# Patient Record
Sex: FEMALE | Race: WHITE
[De-identification: names, ages, dates, MRNs, and addresses within clinical notes are randomized per-mention and may not be internally consistent; named-entity substitution may affect disease eponyms.]

---

## 2015-11-25 VITALS
SYSTOLIC BLOOD PRESSURE: 150 MMHG | SYSTOLIC BLOOD PRESSURE: 150 MMHG | DIASTOLIC BLOOD PRESSURE: 91 MMHG | DIASTOLIC BLOOD PRESSURE: 91 MMHG

## 2017-01-23 ENCOUNTER — HOSPITAL ENCOUNTER (OUTPATIENT)
Dept: HOSPITAL 44 - OUT | Age: 72
End: 2017-01-23
Attending: ANESTHESIOLOGY
Payer: COMMERCIAL

## 2017-01-23 DIAGNOSIS — M70.72: ICD-10-CM

## 2017-01-23 DIAGNOSIS — M70.71: Primary | ICD-10-CM

## 2017-01-23 PROCEDURE — 20610 DRAIN/INJ JOINT/BURSA W/O US: CPT

## 2017-01-23 PROCEDURE — 77002 NEEDLE LOCALIZATION BY XRAY: CPT

## 2017-01-23 PROCEDURE — 99214 OFFICE O/P EST MOD 30 MIN: CPT

## 2017-01-25 NOTE — GREATER THROCHANTERIC BURSA IN
SUBJECTIVE:

I had the opportunity of following up with Kaley Guerrero today as an outpatient at 
[facility].  This is a patient I treated for left-sided 
trochanteric bursitis in November and she had profound immediate relief and is 
overall better.  She says that the symptoms are now returning on the left and 
she has also noted right-sided symptoms primarily in her hips.  She is 
interested in discussing treatment options for bursitis other than a steroid 
injection and I did discuss PRP therapy bilateral hip bursa today and she was 
very receptive to the idea of PRP therapy for her bursitis.  For today, I am 
going to plan on repeating bilateral greater trochanteric injections under 
fluoroscopy. 



PROCEDURE:

Bilateral greater trochanteric bursa injection with fluoroscopic guidance.



DESCRIPTION OF PROCEDURE:

The risks and benefits of the procedure were discussed with the patient, 
including the risk of infection, bleeding, nerve injury. Furthermore, I 
discussed the risk of steroid exposure causing hyperglycemia, hypertension, 
osteoporosis, or increased infectious risks. The patient understood the risks 
and agreed to proceed. Consent was obtained. 



The patient was placed in prone position on the fluoroscopy table and the skin 
overlying the left greater trochanter of the femur was cleaned with an alcohol 
swab. The trochanter was visualized under AP fluoroscopy. A 25 gauge 1.5 
needle was inserted over the trochanter and advanced until contact with the 
trochanter. It was verified that there was no aspiration of fluid or blood.  
Triamcinolone acetate and 0.25% of bupivacaine mixed with 1% lidocaine was 
injected. The needle was removed.   The exact same procedure was then repeated 
on the contralateral greater trochanteric bursa. 



The patient was monitored for 20 minutes following the procedure, during which 
time the patient experienced no adverse sequelae. The patient was discharged to 
home in good condition with a  driving the patient home. 



ASSESSMENT:

Bilateral greater trochanteric bursitis.



PLAN:

Bilateral greater trochanteric bursa injection with fluoroscopic guidance.



FOLLOW UP:

Return to clinic if problems develop or worsen.
CHEYENNE

## 2017-09-11 ENCOUNTER — HOSPITAL ENCOUNTER (OUTPATIENT)
Dept: HOSPITAL 44 - OUT | Age: 72
End: 2017-09-11
Attending: ANESTHESIOLOGY
Payer: COMMERCIAL

## 2017-09-11 DIAGNOSIS — M70.62: Primary | ICD-10-CM

## 2017-09-11 DIAGNOSIS — M70.61: ICD-10-CM

## 2017-09-11 PROCEDURE — 99213 OFFICE O/P EST LOW 20 MIN: CPT

## 2017-09-11 PROCEDURE — 20611 DRAIN/INJ JOINT/BURSA W/US: CPT

## 2017-09-12 NOTE — GREATER THROCHANTERIC BURSA IN
SUBJECTIVE:

I had the opportunity of following up with Kaley Guerrero today as an outpatient at 
[facility].  Kaley is a very pleasant 71-year-old white 
female with bilateral hip pain.  I have treated her with trochanteric bursa 
injections on January 23, 2017, and I had given her 1 previous injection for 
trochanteric bursitis last year and she has done quite well.  I did describe 
PRP injections for chronic recurrent bursitis.  I am going to plan today to 
repeat the bilateral trochanteric bursa injections under fluoroscopy.



PROCEDURE:

Bilateral greater trochanteric bursa injection with fluoroscopy.



DESCRIPTION OF PROCEDURE:

The risks and benefits of the procedure were discussed with the patient, 
including the risk of infection, bleeding, nerve injury. Furthermore, I 
discussed the risk of steroid exposure causing hyperglycemia, hypertension, 
osteoporosis, or increased infectious risks. The patient understood the risks 
and agreed to proceed. Consent was obtained. 



The patient was placed in prone position on the fluoroscopy table and the skin 
overlying the left greater trochanter of the femur was cleaned with an alcohol 
swab. The trochanter was visualized under AP fluoroscopy. A 23-gauge, 3.5 
needle was inserted over the trochanter and advanced until contact with the 
trochanter. It was verified that there was no aspiration of fluid or blood.  
The medications were injected. The needle was removed.



This exact same procedure was then repeated on the right greater trochanteric 
bursa. 



The patient was monitored for 20 minutes following the procedure, during which 
time the patient experienced no adverse sequelae. The patient was discharged to 
home in good condition with a  driving the patient home. 



ASSESSMENT:

Bilateral trochanteric bursitis. 



PLAN: 

Bilateral greater trochanteric bursa injection today with fluoroscopy. 



FOLLOW UP:

Return to clinic if problems develop or worsen.







cc:   Dr. Jerrod QUINN

## 2017-11-18 ENCOUNTER — HOSPITAL ENCOUNTER (EMERGENCY)
Dept: HOSPITAL 44 - ED | Age: 72
Discharge: HOME | End: 2017-11-18
Payer: COMMERCIAL

## 2017-11-18 VITALS
SYSTOLIC BLOOD PRESSURE: 122 MMHG | DIASTOLIC BLOOD PRESSURE: 76 MMHG | SYSTOLIC BLOOD PRESSURE: 122 MMHG | DIASTOLIC BLOOD PRESSURE: 76 MMHG

## 2017-11-18 DIAGNOSIS — S62.102A: Primary | ICD-10-CM
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
## 2017-11-18 PROCEDURE — 73562 X-RAY EXAM OF KNEE 3: CPT

## 2017-11-18 PROCEDURE — 73110 X-RAY EXAM OF WRIST: CPT

## 2017-11-18 PROCEDURE — 99283 EMERGENCY DEPT VISIT LOW MDM: CPT

## 2017-11-18 NOTE — ED PHYSICIAN DOCUMENTATION
Fall





- HISTORIAN


Historian: patient, spouse





- HPI


Chief Complaint: Fall


Additional Information: 





fell 11-1-17 then 3 times since- frequently involving pet dog. involves lt hip 

knee andklwrist w/large bruise spreading from hip distalward


Where: home


Context: tripped, slipped, lost balance


r: moderate


Associated Symptoms:: no loss of consciousness


Location of Pain/Injury: denies: head, neck, face


Injury to Right Extremity: denies: none


Injury to Left Extremity: wrist, knee (pt is chronic pain pt takes hydrocodone)





- ROS


CONST: no problems


NEURO: anxiety.  denies: dizziness, depression


MS/SKIN/LYMPH: denies: weakness, numbness, neck pain, back pain


EYES/ENT: denies: none


CVS/RESP: denies: none


GI/: denies: problems urinating, nausea, vomiting





- PAST HX


Past History: other (depression anxiety gerd)


Allergies/Adverse Reactions: 


 Allergies











Allergy/AdvReac Type Severity Reaction Status Date / Time


 


lithium [Lithium] Allergy Intermediate Hives Verified 11/18/17 16:28


 


amoxicillin trihydrate Allergy   Verified 11/18/17 16:28





[From Augmentin]     


 


potassium clavulanate Allergy   Verified 11/18/17 16:28





[From Augmentin]     


 


azithromycin [From Zithromax] AdvReac  Nausea/Vomi Verified 11/18/17 16:28





   ting  














Home Medications: 


 Ambulatory Orders











 Medication  Instructions  Recorded


 


Cholecalciferol (Vitamin D3) 2,000 unit PO DAILY  av 04/27/16





[Vitamin D]  














- SOCIAL HX


Smoking History: cigarettes


Alcohol Use: rarely


Drug Use: none





- FAMILY HX


Family History: no significant history





- VITAL SIGNS


Vital Signs: 





 Vital Signs











Temp Pulse Resp BP Pulse Ox


 


          150/91    


 


          11/25/15 11:18   














- REVIEWED ASSESSMENTS


Nursing Assessment  Reviewed: Yes


Vitals Reviewed: Yes





ED Results Lab/Radiology





- Radiology


Radiology Impressions: 





impacted fracture lt lwrist   -   knee hip appear ok





- Orders


Orders: 





 ED Orders











 Category Date Time Status


 


 KNEE 3 VIEWS [RAD] Stat Exams  11/18/17 Ordered


 


 LT HIP 2VIEW COMPLETE [RAD] Stat Exams  11/18/17 Ordered


 


 WRIST 3 VIEWS OR MORE [RAD] Stat Exams  11/18/17 Ordered














Fall Physical Exam





- Physical Exam


General Appearance: mild distress, moderate distress


Head: non-tender, no swelling, no obvious injury


Neck: non-tender, painless ROM


Eye: KRYSTAL, EOMI


Resp/CVS: chest non-tender, no ecchymosis, breath sounds nml, no resp. distress

, heart sounds nml


Abdomen: soft, non-tender


Neuro: oriented x3, sensation nml, motor nml, mood/affect nml.  No: unsteady 

gait


Skin: color nml, no rash, ecchymosis (lt hip area spreading down leg to knee-is 

not on blood thinners).  No: cyanosis, diaphoresis, pallor


Joint: No: nml ROM (limited knee and hip and wrist w/swelling-but can ambulate w

/o asst)





- [place] Coma Score


Eyes Open: Spontaneous


Speech: Oriented


Motor: Obeys Commands





Discharge


Clincal Impression: 


 fall w.impacted fracture lt wrist, multi abrasion contusion ligt sprain fr 





Referrals: 


Jerrod Guerrero MD [Primary Care Provider] - 2 Days


Comments: 





see ortho soon continue splint


Condition: Good


Disposition: 01 HOME, SELF-CARE


Decision to Admit: NO


Decision Time: 17:21

## 2017-11-18 NOTE — DIAGNOSTIC IMAGING REPORT
ALLYSON RAMIREZ 

[facility]

[address] [facility].[address] [address]

 

 

 

 

Report Submission Date: 2017 4:49:49 PM CST

Patient       Study

Name:   JORGE LOPEZ       Date:   2017 4:25:51 PM CST

MRN:   [id number]       Modality Type:   CR

Gender:   F       Description:   UPPER EXTREMITY

:   47       Institution:   [facility]

Physician:   ALLYSON RAMIREZ

     Accession:    [id number]

 

 

Examination: Plain film wrist 



History: Fall 



Comparison exams: None available 



Findings: 3 views the wrist demonstrates osteopenia. Articular degenerative 
changes. Fracture involving the distal radius. Avulsion of the ulna styloid. No 
gross soft tissue abnormalities. 



Impression: Distal radial fracture. Ulna styloid avulsion.

 

Electronically signed on 2017 4:49:49 PM CST by:

Phillip QUINN

## 2017-11-18 NOTE — DIAGNOSTIC IMAGING REPORT
ALLYSON RAMIREZ 

[facility]

[address] [facility] [address] [address]

 

 

 

 

Report Submission Date: 2017 4:48:13 PM CST

Patient       Study

Name:   JORGE LOPEZ       Date:   2017 4:12:02 PM CST

MRN:   [id number]       Modality Type:   CR

Gender:   F       Description:   LOWER EXTREMITY

:   47       Institution:   [facility]

Physician:   ALLYSON RAMIREZ

     Accession:    [id number]

 

 

Examination: Plain film knee 



History: Knee discomfort 



Findings: 3 views of the knee demonstrates osteopenia. No fracture.  No 
dislocation. No joint effusion. Mild patellar spurring. No soft tissue 
irregularity. 



Impression: Osteopenia. No acute osseous abnormality.

 

Electronically signed on 2017 4:48:13 PM CST by:

Phillip QUINN

## 2017-11-18 NOTE — DIAGNOSTIC IMAGING REPORT
ALLYSON RAMIREZ 

[facility]

[address] [facility] [address] [address]

 

 

 

 

Report Submission Date: 2017 4:37:12 PM CST

Patient       Study

Name:   JORGE LOPEZ       Date:   2017 4:16:27 PM CST

MRN:   [id number]       Modality Type:   CR

Gender:   F       Description:   PELVIS

:   46       Institution:   [facility]

Physician:   ALLYSON RAMIREZ

     Accession:    [id number]

 

 

Examination: Plain film hip 



History: Hip discomfort 



Comparison exams: None provided 



Findings: 3 views of the hip demonstrates prosthetic device in place. No 
fracture no dislocation. No soft tissue abnormality. 



Impression: Hip replacement in place without fracture or dislocation.

 

Electronically signed on 2017 4:37:12 PM CST by:

Phillip QUINN

## 2018-01-29 ENCOUNTER — HOSPITAL ENCOUNTER (OUTPATIENT)
Dept: HOSPITAL 44 - RAD | Age: 73
End: 2018-01-29
Attending: FAMILY MEDICINE
Payer: COMMERCIAL

## 2018-01-29 DIAGNOSIS — M25.552: Primary | ICD-10-CM

## 2018-01-29 NOTE — DIAGNOSTIC IMAGING REPORT
[facility]

[address] [facility].[address] [address]

 

 

 

 

Report Submission Date: 2018 5:06:54 PM CST

Patient       Study

Name:   JORGE LOPEZ       Date:   2018 4:26:16 PM CST

MRN:   [id number]       Modality Type:   CR

Gender:   F       Description:   PELVIS

:   47       Institution:   [facility]

Physician:   ANDRESSA LOPEZ

     Accession:    [id number]

 

 

Examination: Plain film hip 



History: Hip discomfort 



Comparison exams: None provided 



Findings: 2 views of the hip demonstrates a prosthetic device in place. No 
fracture no dislocation. No soft tissue abnormality. 



Impression: Prosthetic device. No acute appearing osseous abnormality.

 

Electronically signed on 2018 5:06:54 PM CST by:

Phillip QUINN

## 2018-03-30 ENCOUNTER — HOSPITAL ENCOUNTER (EMERGENCY)
Dept: HOSPITAL 44 - ED | Age: 73
Discharge: HOME | End: 2018-03-30
Payer: COMMERCIAL

## 2018-03-30 VITALS
DIASTOLIC BLOOD PRESSURE: 78 MMHG | SYSTOLIC BLOOD PRESSURE: 139 MMHG | SYSTOLIC BLOOD PRESSURE: 139 MMHG | SYSTOLIC BLOOD PRESSURE: 139 MMHG | DIASTOLIC BLOOD PRESSURE: 78 MMHG | SYSTOLIC BLOOD PRESSURE: 139 MMHG | DIASTOLIC BLOOD PRESSURE: 78 MMHG | DIASTOLIC BLOOD PRESSURE: 78 MMHG | DIASTOLIC BLOOD PRESSURE: 78 MMHG | DIASTOLIC BLOOD PRESSURE: 78 MMHG | SYSTOLIC BLOOD PRESSURE: 139 MMHG | SYSTOLIC BLOOD PRESSURE: 139 MMHG | DIASTOLIC BLOOD PRESSURE: 78 MMHG | DIASTOLIC BLOOD PRESSURE: 78 MMHG | SYSTOLIC BLOOD PRESSURE: 139 MMHG | SYSTOLIC BLOOD PRESSURE: 139 MMHG

## 2018-03-30 DIAGNOSIS — M79.1: Primary | ICD-10-CM

## 2018-03-30 PROCEDURE — 99283 EMERGENCY DEPT VISIT LOW MDM: CPT

## 2018-03-30 NOTE — ED PHYSICIAN DOCUMENTATION
General Adult





- HISTORIAN


Historian: patient





- HPI


Stated Complaint: Pain to frontal hip (L) that started at 3am, Hx chronic 

lateral Lt hip pain


Chief Complaint: General Adult


Onset: hours


Timing: still present


Severity: moderate


Further Comments: yes (Pt is a 73 yo female with pain in her L hip.  Pt has had 

L hip replacement and has had chronic lateral hip pain, for which she sees Dr. Newby.  Pain today started at 3:00 am.)





- ROS


CONST: no problems


EYES/ENT: none


CVS/RESP: none


GI/: none


MS/SKIN/LYMPH: other (L hip pain)





- PAST HX


Past History: other (HTN, GERD, Depression/Anxiety, Hearing loss, vision 

problems)


Surgeries/Procedures: other (R shoulder replacement; L hip replacement)


Allergies/Adverse Reactions: 


 Allergies











Allergy/AdvReac Type Severity Reaction Status Date / Time


 


lithium [Lithium] Allergy Intermediate Hives Verified 03/30/18 07:56


 


amoxicillin trihydrate Allergy   Verified 03/30/18 07:56





[From Augmentin]     


 


potassium clavulanate Allergy   Verified 03/30/18 07:56





[From Augmentin]     


 


azithromycin [From Zithromax] AdvReac  Nausea/Vomi Verified 03/30/18 07:56





   ting  














Home Medications: 


 Ambulatory Orders











 Medication  Instructions  Recorded


 


Cholecalciferol (Vitamin D3) 2,000 unit PO DAILY  av 04/27/16





[Vitamin D]  














- SOCIAL HX


Smoking History: cigarettes





- FAMILY HX


Family History: No





- VITAL SIGNS


Vital Signs: 





 Vital Signs











Temp Pulse Resp BP Pulse Ox


 


 98.1 F   88   16   150/86   93 


 


 03/30/18 07:10  03/30/18 07:10  03/30/18 07:10  03/30/18 07:10  03/30/18 07:10














- REVIEWED ASSESSMENTS


Nursing Assessment  Reviewed: Yes


Vitals Reviewed: Yes





Progress





- Progress


Progress: 





X-ray L hip: Left hip replacement. Osteopenia and degenerative changes. No 

acute appearing osseous abnormality.





ED Results Lab/Radiology





- Orders


Orders: 





 ED Orders











 Category Date Time Status


 


 LT HIP 2VIEW COMPLETE [RAD] Stat Exams  03/30/18 Ordered














General Adult Physical Exam





- PHYSICAL EXAM


GENERAL APPEARANCE: mild distress


NECK: normal inspection, supple


RESPIRATORY: no resp distress, chest non-tender, breath sounds normal


CVS: reg rate & rhythm, heart sounds normal


BACK: normal inspection


SKIN: warm/dry, normal color


EXTREMITIES: other (L hip tenderness; FROM)


NEURO: oriented X3, motor nml





Discharge


Clincal Impression: 


 Musculoskeletal pain





Referrals: 


Jerrod Guerrero MD [Primary Care Provider] - 


Condition: Good


Disposition: 01 HOME, SELF-CARE


Decision to Admit: NO


Decision Time: 09:10

## 2018-03-30 NOTE — DIAGNOSTIC IMAGING REPORT
[facility]

[address] [facility].[address] [address]

 

 

 

 

Report Submission Date: Mar 30, 2018 8:47:06 AM CDT

Patient       Study

Name:   JORGE LOPEZ       Date:   Mar 30, 2018 8:17:07 AM CDT

MRN:   [id number]       Modality Type:   DX

Gender:   F       Description:   PELVIS

:   47       Institution:   [facility]

Physician:   ANNIE WHITE

     Accession:    [id number]

 

 

Examination: Plain film pelvis/left hip 



History: PT STATE HIP PAIN FOR 3X DAYS, HX OF SURGERY (Hx) 



Comparison exams: None provided 



Findings: 3 views of the pelvis and left hip demonstrate left hip prosthesis in 
place. Generalized osteopenia. No fracture.  No dislocation. Articular 
degenerative changes. No soft tissue abnormality. 



Impression: Left hip replacement. Osteopenia and degenerative changes. No acute 
appearing osseous abnormality.

 

Electronically signed on Mar 30, 2018 8:47:06 AM CDT by:

Phillip QUINN

## 2018-04-17 ENCOUNTER — HOSPITAL ENCOUNTER (OUTPATIENT)
Dept: HOSPITAL 44 - OUT | Age: 73
End: 2018-04-17
Attending: ANESTHESIOLOGY
Payer: COMMERCIAL

## 2018-04-17 DIAGNOSIS — M70.60: Primary | ICD-10-CM

## 2018-04-17 PROCEDURE — 99214 OFFICE O/P EST MOD 30 MIN: CPT

## 2018-04-19 NOTE — PAIN CLINIC PROGRESS NOTES
REASON FOR VISIT: 

I had the opportunity of seeing Kaley Guerrero in follow up.   This is a delightful 
72-year-old white female who I have treated for bilateral greater trochanteric 
bursitis.  She says that a couple of weeks ago she was having relatively severe 
pain in the area of the posterior sciatic notch on both sides when she walked.  
She said her walking was limited.  Her ambulation could not be sustained and 
she had been trying to walk for exercise but that between the pain and the 
weather, she has been unable to do any sustained walking.  She said that over 
the course of the last 2 weeks that the symptoms have for the most part 
resolved.  She says at this point, she is having very little pain in the 
posterior gluteal fold, achiness of the legs, or weakness.  I re-examined her 
today and the area of her chief complaint is at the bilateral sciatic notch.  
There is no evidence of radiculitis.  Negative straight leg raise.  Negative 
assisted extension and extension rotation findings.  Some tenderness over the 
bilateral greater trochanteric bursa.  



ASSESSMENT:

1.   Likely spinal stenosis.  I have recommended doing an MRI study and 
possible caudal epidural.

2.   History of greater trochanteric bursitis, now with some mild recurrence. 



PLAN: 

 At this point, Ms. Guerrero is doing well and is relatively asymptomatic.  I did 
not recommend any further interventional treatment today.  She is in agreement 
but would like a follow up next month, as she thinks that her symptoms may well 
reoccur when she begins more active activity with the better weather.  



Thank you again for allowing me to take part in the care of this nice lady.  I 
appreciate the opportunity to take part in the care of your patients. 





cc:   Dr. Jerrod QUINN

## 2018-05-15 ENCOUNTER — HOSPITAL ENCOUNTER (OUTPATIENT)
Dept: HOSPITAL 44 - OUT | Age: 73
End: 2018-05-15
Attending: ANESTHESIOLOGY
Payer: COMMERCIAL

## 2018-05-15 DIAGNOSIS — M70.62: Primary | ICD-10-CM

## 2018-05-15 DIAGNOSIS — M70.61: ICD-10-CM

## 2018-05-15 PROCEDURE — 99213 OFFICE O/P EST LOW 20 MIN: CPT

## 2018-05-16 NOTE — GREATER THROCHANTERIC BURSA IN
SUBJECTIVE:

Ms. Guerrero is a very nice 72-year-old white female who follows up with me today 
with left hip and lateral thigh pain consistent with trochanteric bursitis.  I 
re-examined her and she has a negative straight leg raise but no evidence of 
radiculitis.  Symptoms are primarily reproduced over the trochanteric bursa.  
Plan for a left trochanteric bursa injection under fluoroscopy today. 



PROCEDURE:

Left greater trochanteric bursa injection with fluoroscopy.



DESCRIPTION OF PROCEDURE:

The risks and benefits of the procedure were discussed with the patient, 
including the risk of infection, bleeding, nerve injury.  Furthermore, I 
discussed the risk of steroid exposure causing hyperglycemia, hypertension, 
osteoporosis, or increased infectious risks. The patient understood the risks 
and agreed to proceed.  Consent was obtained. 



The patient was placed in prone position on the fluoroscopy table and the skin 
overlying the left greater trochanter of the femur was cleaned.  The trochanter 
was visualized under AP fluoroscopy.   A needle was inserted over the 
trochanter and advanced until contact with the trochanter.  It was verified 
that there was no aspiration of fluid or blood. The medication was injected.  
The needle was removed. 



The patient was monitored for 20 minutes following the procedure, during which 
time the patient experienced no adverse sequelae. The patient was discharged to 
home in good condition with a  driving the patient home. 



ASSESSMENT:

Trochanteric bursitis of the hip.



PLAN:

Left greater trochanteric bursa injection today. 



FOLLOW UP:

Return to clinic if problems develop or worsen.





cc:  Dr. Jerrod QUINN

## 2018-08-21 ENCOUNTER — HOSPITAL ENCOUNTER (OUTPATIENT)
Dept: HOSPITAL 44 - OUT | Age: 73
End: 2018-08-21
Attending: ANESTHESIOLOGY
Payer: COMMERCIAL

## 2018-08-21 DIAGNOSIS — M70.61: ICD-10-CM

## 2018-08-21 DIAGNOSIS — M70.62: Primary | ICD-10-CM

## 2018-08-21 PROCEDURE — 20611 DRAIN/INJ JOINT/BURSA W/US: CPT

## 2018-08-21 PROCEDURE — 99213 OFFICE O/P EST LOW 20 MIN: CPT

## 2018-08-26 NOTE — GREATER THROCHANTERIC BURSA IN
SUBJECTIVE:

Kaley follows up with me today with bilateral hip pain.  She has multiple 
complaints primarily polyarthropathy related.  She says the worst complaint is 
her hips, right somewhat worse than the left.  She says the best she has ever 
done is following bilateral trochanteric bursa injections and she follows up 
with bilateral trochanteric bursitis today.  She has questions regarding 
activity.  She also has complaints of heel pain and has a presumptive diagnosis 
of plantar fasciitis.   She has orthotics in her shoes at this point.  



PHYSICAL EXAMINATION:

General:  The patient is well nourished, well developed, and in no apparent 
distress. Awake, alert, and oriented. 

HEENT: Pupils are equal, round, and reactive to light and accommodation. 
Extraocular movements intact. No facial droop.

Neck: There is full range of motion of the cervical spine. No evidence of 
adenopathy. Thyroid is nontender, not enlarged. Carotids are without bruits.

Chest: Clear to auscultation bilaterally. Normal chest  excursion.

Heart: Regular rate and rhythm without murmur.

Abdomen: Benign. Normoactive bowel sounds.

Motor/sensory: Intact in the upper and lower extremities. Moves all extremities 
freely.

Extremities:  Positive Tinel's sign at bilateral trochanteric bursa. 

 

PROCEDURE:

Bilateral greater trochanteric bursa injection with fluoroscopy.



DESCRIPTION OF PROCEDURE:

The risks and benefits of the procedure were discussed with the patient, 
including the risk of infection, bleeding, nerve injury. Furthermore, I 
discussed the risk of steroid exposure causing hyperglycemia, hypertension, 
osteoporosis, or increased infectious risks. The patient understood the risks 
and agreed to proceed. Consent was obtained. 



The patient was placed in prone position on the fluoroscopy table and the skin 
overlying the left greater trochanter of the femur was cleaned with an alcohol 
swab. The trochanter was visualized under AP fluoroscopy.   A needle was 
inserted over the trochanter and advanced until contact with the trochanter.  It
was verified that there was no aspiration of fluid or blood.  The medication was
injected.  The needle was removed. 



This exact same procedure was repeated on the contralateral greater trochanteric
bursa. 



The patient was monitored for 20 minutes following the procedure, during which 
time the patient experienced no adverse sequelae. The patient was discharged to 
home in good condition with a  driving the patient home. 



ASSESSMENT:

Bilateral greater trochanteric bursitis. 



PLAN:

Bilateral greater trochanteric bursa injection today under fluoroscopy.



FOLLOW UP:

Return to clinic if problems develop or worsen.





cc:  Dr. Jerrod QUINN

## 2018-11-05 ENCOUNTER — HOSPITAL ENCOUNTER (OUTPATIENT)
Dept: HOSPITAL 44 - POD | Age: 73
End: 2018-11-05
Attending: PODIATRIST
Payer: COMMERCIAL

## 2018-11-05 DIAGNOSIS — M77.41: ICD-10-CM

## 2018-11-05 DIAGNOSIS — M72.2: Primary | ICD-10-CM

## 2018-11-05 DIAGNOSIS — M77.42: ICD-10-CM

## 2018-11-05 PROCEDURE — 99212 OFFICE O/P EST SF 10 MIN: CPT

## 2018-11-06 NOTE — OP CLINIC PROGRESS NOTE
SUBJECTIVE:

Kaley Guerrero is a 72-year-old female who presented today for follow up of 
bilateral heel pain and bilateral metatarsal head pain.  The patient was sent 
after her last visit for an orthopedic consult for an opinion on if there were 
other issues more proximal in the kinetic chain.   It appears that the patient 
was diagnosed with plantar fasciitis by the orthopedic doctor as well, as well 
as, tiara and was given padding and stretching exercises to do.  The 
patient states that she has been doing stretching exercises and her  has 
been helping with those and massaging her feet and she was coming in today to 
have an injection performed to her heel but she made this appointment about 4 
weeks ago and the pain is much improved at this time.  She does not wish to have
an injection at this time and feels that overall she is improving greatly.  She 
leaves town in the next 10 days, I believe.  She does not admit to any fevers, 
chills, nausea, vomiting, shortness of breath, or chest pain at this time.  
Notable history includes chronic low back pain and GERD.



OBJECTIVE:

Vital Signs:   Pulse oximetry was 91%, BP:  122/65, heart rate 81, R: 18.

Dermatologic exam:  There are no open lesions, no hyperkeratosis noted, and no 
significant varicosities noted on bilateral feet.

VASCULAR:   There are 2+ DP and PT pulses bilaterally.  Capillary refill time is
less than 3 seconds to the toes bilaterally.   No edema bilaterally. 

MUSCULOSKELETAL:   No pain on palpation noted to the metatarsal heads 
bilaterally.  There is mild pain on palpation noted to the plantar medial heel 
bilaterally.  There is about 5 degrees of dorsiflexion range of motion in the 
ankle with the knees extended bilaterally.  I believe this is improved from last
time.  There are no other gross abnormalities noted outside of some hammertoes 
and minor hallux valgus bilaterally. 

NEUROLOGIC:   Light touch sensation is intact to the toes bilaterally. 



ASSESSMENT:

1.   Plantar fasciitis, bilateral. 

2.   Metatarsalgia of bilateral feet. 



PLAN: 

The patient seems to be improving greatly with stretching exercises, etc. given 
by myself, as well as by Dr. Behrouzi, the orthopedic doctor from [place] whom 
the patient was referred to.  The patient refuses any sort of shot today and 
believes she is improving greatly at this time.  She will return to the clinic 
as needed if she continues to have pain or stops improving or worsens in any way
in her feet.  The patient is happy with this plan and denies any other questions
or concerns at this time.  





cc:   Dr. Jerrod QUINN

## 2018-12-18 ENCOUNTER — HOSPITAL ENCOUNTER (OUTPATIENT)
Dept: HOSPITAL 44 - OUT | Age: 73
End: 2018-12-18
Attending: ANESTHESIOLOGY
Payer: COMMERCIAL

## 2018-12-18 DIAGNOSIS — M70.62: Primary | ICD-10-CM

## 2018-12-18 PROCEDURE — 20610 DRAIN/INJ JOINT/BURSA W/O US: CPT

## 2018-12-18 PROCEDURE — 77002 NEEDLE LOCALIZATION BY XRAY: CPT

## 2018-12-28 NOTE — GREATER THROCHANTERIC BURSA IN
SUBJECTIVE:

Ms. Guerrero follows up with me today with a history of recurrent left hip pain.  
She says she has been doing some exercises for plantar fasciitis and has 
suffered recurrent pain over the left trochanteric bursa.  She has had good 
success with a previous trochanteric bursa injection, and I am going to plan on 
repeating it on the left side today. 



PROCEDURE:

Left greater trochanteric bursa injection with fluoroscopy.



DESCRIPTION OF PROCEDURE:

The risks and benefits of the procedure were discussed with the patient, 
including the risk of infection, bleeding, nerve injury.  Furthermore, I 
discussed the risk of steroid exposure causing hyperglycemia, hypertension, 
osteoporosis, or increased infectious risks.  The patient understood the risks 
and agreed to proceed.  Consent was obtained. 



The patient was placed in prone position on the fluoroscopy table and the skin 
overlying the left greater trochanter of the femur was cleaned. The trochanter 
was visualized under AP fluoroscopy.  A needle was inserted over the trochanter 
and advanced until contact with the trochanter.  It was verified that there was 
no aspiration of fluid or blood.  At this point, Omnipaque 240 myelogram dye was
injected.  It was verified with fluoroscopic views that the dye was located in 
the desired distribution. The medication was injected.  The needle was removed. 



The patient was monitored for 20 minutes following the procedure, during which 
time the patient experienced no adverse sequelae. The patient was discharged to 
home in good condition with a  driving the pt. home. 



ASSESSMENT:

Left greater trochanteric bursitis.



PLAN:

Left greater trochanteric bursa injection today. 



FOLLOW UP:

Return to clinic if problems develop or worsen.





cc:  Dr. Jerrod QUINN

## 2019-01-18 ENCOUNTER — HOSPITAL ENCOUNTER (EMERGENCY)
Dept: HOSPITAL 44 - ED | Age: 74
Discharge: HOME | End: 2019-01-18
Payer: COMMERCIAL

## 2019-01-18 DIAGNOSIS — S70.01XA: Primary | ICD-10-CM
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
## 2019-01-18 PROCEDURE — 99281 EMR DPT VST MAYX REQ PHY/QHP: CPT

## 2019-01-24 ENCOUNTER — HOSPITAL ENCOUNTER (OUTPATIENT)
Dept: HOSPITAL 44 - RAD | Age: 74
End: 2019-01-24
Attending: FAMILY MEDICINE
Payer: COMMERCIAL

## 2019-01-24 DIAGNOSIS — M16.11: Primary | ICD-10-CM

## 2019-01-24 PROCEDURE — 73502 X-RAY EXAM HIP UNI 2-3 VIEWS: CPT

## 2019-01-24 NOTE — DIAGNOSTIC IMAGING REPORT
ANDRESSA LOPEZ 

[facility]

[address] [facility] [address] [address]

 

 

 

 

Report Submission Date: 2019 5:29:12 PM CST

Patient       Study

Name:   JORGE LOPEZ       Date:   2019 4:05:39 PM CST

MRN:   [id number]       Modality Type:   DX

Gender:   F       Description:   PELVIS

:   47       Institution:   [facility]

Physician:   ANDRESSA LOPEZ

     Accession:    [id number]

 

 

Right hip 2 views 

Clinical history pain 

Technique AP frog leg. 

Findings: This appeared joint space narrowing. No fracture dislocation is 
identified. Bone density is within normal limits. The right pubic rami are 
intact 

Impression: Degenerative arthritis otherwise negative

 

Electronically signed on 2019 5:29:12 PM CST by:

Jon QUINN

## 2019-02-24 ENCOUNTER — HOSPITAL ENCOUNTER (OUTPATIENT)
Dept: HOSPITAL 44 - ED | Age: 74
Setting detail: OBSERVATION
LOS: 1 days | Discharge: HOME | End: 2019-02-25
Attending: FAMILY MEDICINE | Admitting: FAMILY MEDICINE
Payer: COMMERCIAL

## 2019-02-24 VITALS — BODY MASS INDEX: 26.4 KG/M2

## 2019-02-24 DIAGNOSIS — S32.511A: Primary | ICD-10-CM

## 2019-02-24 DIAGNOSIS — Z96.642: ICD-10-CM
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
## 2019-02-24 DIAGNOSIS — M79.89: ICD-10-CM
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
## 2019-02-24 DIAGNOSIS — M15.9: ICD-10-CM

## 2019-02-24 DIAGNOSIS — F32.9: ICD-10-CM

## 2019-02-24 PROCEDURE — 99283 EMERGENCY DEPT VISIT LOW MDM: CPT

## 2019-02-24 PROCEDURE — 81002 URINALYSIS NONAUTO W/O SCOPE: CPT

## 2019-02-24 PROCEDURE — 99284 EMERGENCY DEPT VISIT MOD MDM: CPT

## 2019-02-24 PROCEDURE — 70450 CT HEAD/BRAIN W/O DYE: CPT

## 2019-02-24 PROCEDURE — 99217: CPT

## 2019-02-24 PROCEDURE — 72170 X-RAY EXAM OF PELVIS: CPT

## 2019-02-24 PROCEDURE — 93970 EXTREMITY STUDY: CPT

## 2019-02-24 PROCEDURE — G0378 HOSPITAL OBSERVATION PER HR: HCPCS

## 2019-02-24 PROCEDURE — 80048 BASIC METABOLIC PNL TOTAL CA: CPT

## 2019-02-24 PROCEDURE — 72192 CT PELVIS W/O DYE: CPT

## 2019-02-24 PROCEDURE — 85025 COMPLETE CBC W/AUTO DIFF WBC: CPT

## 2019-02-24 RX ADMIN — HEPARIN SODIUM SCH UNIT: 5000 INJECTION, SOLUTION INTRAVENOUS; SUBCUTANEOUS at 23:16

## 2019-02-24 NOTE — ED PHYSICIAN DOCUMENTATION
Fall





- HISTORIAN


Historian: patient, spouse





- HPI


Stated Complaint: fall


Chief Complaint: Fall


Additional Information: 





Patient presents to ED via EMS after falling tonight.  Patient states she fell 

backward falling on her buttocks and hitting the back of her head while trying 

to stand from her wheel chair to a walker.  Patient states she did not lose 

consciousness.  This is her third fall in the past 2 months.  She has a skin 

tear on her left elbow.   reports difficulty taking care of her at home. 


Onset: just prior to arrival


Where: home


Context: lost balance


r: mild


Associated Symptoms:: no loss of consciousness


Location of Pain/Injury: head, other (tailbone)


Injury to Right Extremity: none


Injury to Left Extremity: elbow (skin tear)





- ROS


CONST: denies: fever


NEURO: denies: dizziness


MS/SKIN/LYMPH: weakness


EYES/ENT: none


CVS/RESP: none


GI/: denies: nausea, vomiting





- PAST HX


Past History: none


Allergies/Adverse Reactions: 


                                    Allergies











Allergy/AdvReac Type Severity Reaction Status Date / Time


 


lithium [Lithium] Allergy Intermediate Hives Verified 18 07:56


 


acetaminophen Allergy Unknown  Unverified 13 13:18


 


bupropion HCl Allergy Unknown  Unverified 13 13:17


 


hydrocodone bitartrate Allergy Unknown  Unverified 13 13:18


 


iodine Allergy Unknown  Unverified 13 13:18


 


amoxicillin trihydrate Allergy   Verified 18 07:56





[From Augmentin]     


 


potassium clavulanate Allergy   Verified 18 07:56





[From Augmentin]     


 


azithromycin [From Zithromax] AdvReac  Nausea/Vomi Verified 18 07:56





   ting  














Home Medications: 


                                Ambulatory Orders











 Medication  Instructions  Recorded


 


Cholecalciferol (Vitamin D3) 2,000 unit PO DAILY  av 16





[Vitamin D]  


 


Bupropion HCl 75 mg PO [id number]


 


Clorazepate Dipotassium [Tranxene 15 mg PO TID 19





T-Tab]  














- SOCIAL HX


Smoking History: non-smoker


Alcohol Use: none


Drug Use: none





- FAMILY HX


Family History: none





- VITAL SIGNS


Vital Signs: 





                                   Vital Signs











Temp Pulse Resp BP Pulse Ox


 


          139/78    


 


          18 09:04   














- REVIEWED ASSESSMENTS


Nursing Assessment  Reviewed: Yes


Vitals Reviewed: Yes





ED Results Lab/Radiology





- Radiology


Radiology Impressions: 


Report Submission Date: 2019 8:33:38 PM CST


Patient       Study


Name:   JORGE LOPEZ       Date:   2019 8:04:56 PM CST


MRN:   [id number]       Modality Type:   DX


Gender:   F       Description:   PELVIS AP 1 OR 2 VIEWS


:   47       Institution:   [facility]


Physician:   COLE MANCILLA


     Accession:    [id number]


 


 


Single view pelvis 





Clinical history: Trauma 





Findings/impression: 





The left hip replacement is in good position. The right hip joint is maintained.

There is likely nondisplaced right medial pubic rami/pubic symphysis fracture.


 


Electronically signed on 2019 8:33:38 PM [facility] by:


Zachariah Brown











Report Submission Date: 2019 8:36:58 PM CST


Patient       Study


Name:   JORGE LOPEZ       Date:   2019 8:10:43 PM CST


MRN:   [id number]       Modality Type:   CT\SR


Gender:   F       Description:   CT BRAIN W/O CONTRAST


:   47       Institution:   [facility]


Physician:   COLE MANCILLA


     Accession:    [id number]


 


 


CT brain noncontrast 








CLINICAL HISTORY:  FALL, HIT HEAD, NO LOSS OF CONSCIOUSNESS (Hx) / ITS.REASON 

fall 


-------------------------------------------------- 


Note time : 2019 9:26:09 PM 


User : Feliberto Simomns 





FALL 


-------------------------------------------------- (DICOM Hx) 








TECHNIQUE: 


5 mm contiguous axial images of the brain, noncontrast. 





FINDINGS: 


There is no evidence of intracranial mass effect, hemorrhage, or acute 

hydrocephalus. The lateral ventricles are symmetrical and the 4th ventricle is 

midline without shift. No acute brain parenchymal changes or extra-axial fluid 

collections are identified. The posterior fossa contents are within normal l

imits. 





The calvarium is intact. The visualized sinuses and mastoid air cells are clear.







IMPRESSION: 


No acute intracranial process.


 


Electronically signed on 2019 8:36:58 PM CST by:


Zachariah Brown





- Orders


Orders: 





                                    ED Orders











 Category Date Time Status


 


 CT BRAIN W/O CONTRAST Stat Exams  19 Ordered


 


 PELVIS AP 1 OR 2 VIEWS [RAD] Stat Exams  19 Ordered














Fall Physical Exam





- Physical Exam


General Appearance: no acute distress, alert


Head: non-tender


Neck: painless ROM


Eye: KRYSTAL


ENT: nml external inspection


Resp/CVS: chest non-tender, breath sounds nml


Abdomen: soft, normal bowel sounds


Neuro: oriented x3, motor nml, mood/affect nml


Skin: color nml


Back: normal inspection, no CVA tenderness


Extremities: atraumatic, pelvis stable, hips non-tender, other (+1 lower 

extremity edema to knee bilaterally)


Joint: nml ROM





- Reickson Coma Score


Eyes Open: Spontaneous


Speech: Oriented


Motor: Obeys Commands





Discharge


Clincal Impression: 


 Intractable pain, Difficulty with activities of daily living





Fracture of right superior pubic ramus


Qualifiers:


 Encounter type: initial encounter Fracture type: closed Qualified Code(s): 

S32.511A - Fracture of superior rim of right pubis, initial encounter for closed

 fracture





Condition: Stable


Disposition: 09 ADMITTED AS INPATIENT


Decision to Admit: [id number]


Date of Decison to Admit: 19


Decision Time: 23:02

## 2019-02-25 VITALS
SYSTOLIC BLOOD PRESSURE: 152 MMHG | SYSTOLIC BLOOD PRESSURE: 152 MMHG | SYSTOLIC BLOOD PRESSURE: 152 MMHG | DIASTOLIC BLOOD PRESSURE: 86 MMHG | DIASTOLIC BLOOD PRESSURE: 86 MMHG | DIASTOLIC BLOOD PRESSURE: 86 MMHG | DIASTOLIC BLOOD PRESSURE: 86 MMHG | SYSTOLIC BLOOD PRESSURE: 152 MMHG

## 2019-02-25 LAB
APPEARANCE UR: CLEAR
BASOPHILS NFR BLD: 1.8 % (ref 0–1.5)
COLOR,URINE: YELLOW
EGFR (NON-AFRICAN): > 60
EOSINOPHIL NFR BLD: 2.4 % (ref 0–6.8)
MCH RBC QN AUTO: 24.3 PG (ref 28–34)
MCV RBC AUTO: 76 FL (ref 80–100)
MONOCYTES %: 4.8 % (ref 0–11)
NEUTROPHILS #: 7.1 # K/UL (ref 1.4–7.7)
PH UR STRIP: 7 [PH] (ref 5–8)
RBC UR QL: (no result)
UROBILINOGEN URINE: 0.2 EU (ref 0.2–1)

## 2019-02-25 RX ADMIN — HEPARIN SODIUM SCH UNIT: 5000 INJECTION, SOLUTION INTRAVENOUS; SUBCUTANEOUS at 12:31

## 2019-02-25 RX ADMIN — HEPARIN SODIUM SCH UNIT: 5000 INJECTION, SOLUTION INTRAVENOUS; SUBCUTANEOUS at 06:00

## 2019-02-25 NOTE — DIAGNOSTIC IMAGING REPORT
COLE MANCILLA 

[facility]

[address] [facility] [address] [place] [address]. [address]

 

 

 

 

Report Submission Date: 2019 8:33:38 PM CST

Patient       Study

Name:   JORGE LOPEZ       Date:   2019 8:04:56 PM CST

MRN:   [id number]       Modality Type:   DX

Gender:   F       Description:   PELVIS AP 1 OR 2 VIEWS

:   47       Institution:   [facility]

Physician:   COLE MANCILLA

     Accession:    [id number]

 

 

Single view pelvis 



Clinical history: Trauma 



Findings/impression: 



The left hip replacement is in good position. The right hip joint is maintained.
There is likely nondisplaced right medial pubic rami/pubic symphysis fracture.

 

Electronically signed on 2019 8:33:38 PM CST by:

Zachariah QUINN

## 2019-02-25 NOTE — DISCHARGE SUMMARY
Discharge Summary





- Discharge Sumary


History of Present Illness: 


aidan presents to ED via EMS after falling .  Patient states she fell backward 

falling on her buttocks and hitting the back of her head while trying to stand 

from her wheel chair to a walker.  Patient states she did not lose 

consciousness.  This is her third fall in the past 2 months.  She has a skin 

tear on her left elbow.   reports difficulty taking care of her at home. 

Onset: just prior to arrival. Patient was found to have a nondisplaced right 

medial pubic rami/pubic synthesis fracture. Patient was having pain with weight-

bearing. Patient was subsequently put in to the hospital on observation for pain

control.





Condition at Discharge: Stable


Home Medications: 


                                Ambulatory Orders











 Medication  Instructions  Recorded


 


Cholecalciferol (Vitamin D3) 2,000 unit PO DAILY  av 04/27/16





[Vitamin D3]  


 


Bupropion HCl 75 mg PO [id number] 02/24/19


 


Clorazepate Dipotassium 15 mg PO BID 02/25/19


 


Duloxetine HCl [Cymbalta] 60 mg PO D #180 tablet 02/25/19


 


Hydrochlorothiazide 37.5 mg PO DAILY #90 tb 02/25/19











Consultations this Visit: None


Procedures this Visit: Other (CT scan pelvic; US bilat lower extremities)


Allergies/Adverse Reactions: 


                                    Allergies











Allergy/AdvReac Type Severity Reaction Status Date / Time


 


lithium [Lithium] Allergy Intermediate Hives Verified 03/30/18 07:56


 


iodine Allergy Unknown  Unverified 02/06/13 13:18


 


amoxicillin trihydrate Allergy   Verified 03/30/18 07:56





[From Augmentin]     


 


azithromycin [From Zithromax] AdvReac  Nausea/Vomi Verified 03/30/18 07:56





   ting  











Discharge Summary: 


Patient was continued on her home oxycodone/acetaminophen 5/325. Patient seemed 

to do fairly well with this although was taking it more frequently and she was 

normally at home. Patient was continued on her of the medication. Patient was 

able to have a bowel movement during her hospitalization. Physical therapy with 

consulted and was felt that the patient was going to be able to adequately 

return home with outpatient physical therapy. Patient did not develop any ileus.

Patient other chronic medical problems remain stable during her 

hospitalization.Patient was subsequently discharged home in stable condition. 








Admission Date: 24 FEB 2019


Observation Care


Discharge Date: 25 FEB 2019


Dsiposition: Home











- Final Diagnosis


(1) Fracture of pubic ramus


Problems: 


symptomatic care,Patient will be continued on oxycodone/acetaminophen 1Q6 hours 

PRN for pain. Patient will be referred to physical therapy to help with her 

ambulation and fall prevention.








(2) Depression


Problems: 


Stable at this time continue present home medications.








(3) Generalized osteoarthritis


Problems: 


Stable at this time continue present home medications.








(4) GERD without esophagitis


Problems: 


Stable at this time continue present home medications.








(5) Gait disturbance


Problems: 


Patient will be started on physical therapy

## 2019-02-25 NOTE — DIAGNOSTIC IMAGING REPORT
ANDRESSA LOPEZ 

[facility]

[address] [facility] [address]

 

 

 

 

Report Submission Date: 2019 12:51:00 PM CST

Patient       Study

Name:   JORGE LOPEZ       Date:   2019 10:08:42 AM CST

MRN:   [id number]       Modality Type:   US

Gender:   F       Description:   US EXTREMITY VEINS UNILAT

:   47       Institution:   [facility]

Physician:   ANDRESSA LOPEZ

     Accession:    [id number]

 

 

Examination: Ultrasound left vein 



History: POSSIBLE HIP FRACTURE LT LEG SWELLING 



Findings: Sonographic evaluation of the left lower extremity venous system from 
the groin to the popliteal fossa inclusive. Normal compressibility. No luminal 
filling defect. Normal waveforms and response to augmentation. No popliteal 
region fluid collection. 



Impression: No evidence for deep venous thrombosis.

 

Electronically signed on 2019 12:51:00 PM CST by:

Phillip QUINN

## 2019-02-25 NOTE — DIAGNOSTIC IMAGING REPORT
COLE MANCILLA 

[facility]

[address] [facility] [address] [place] [address]. [address]

 

 

 

 

Report Submission Date: 2019 8:36:58 PM CST

Patient       Study

Name:   JORGE LOPEZ       Date:   2019 8:10:43 PM CST

MRN:   [id number]       Modality Type:   CT\SR

Gender:   F       Description:   CT BRAIN W/O CONTRAST

:   47       Institution:   [facility]

Physician:   COLE MANCILLA

     Accession:    [id number]

 

 

CT brain noncontrast 





CLINICAL HISTORY:  FALL, HIT HEAD, NO LOSS OF CONSCIOUSNESS (Hx) / ITS.REASON 
fall 

-------------------------------------------------- 

Note time : 2019 9:26:09 PM 

User : Feliberto Simmons 



FALL 

-------------------------------------------------- (DICOM Hx) 





TECHNIQUE: 

5 mm contiguous axial images of the brain, noncontrast. 



FINDINGS: 

There is no evidence of intracranial mass effect, hemorrhage, or acute 
hydrocephalus. The lateral ventricles are symmetrical and the 4th ventricle is 
midline without shift. No acute brain parenchymal changes or extra-axial fluid 
collections are identified. The posterior fossa contents are within normal 
limits. 



The calvarium is intact. The visualized sinuses and mastoid air cells are clear.




IMPRESSION: 

No acute intracranial process.

 

Electronically signed on 2019 8:36:58 PM CST by:

Zachariah QUINN

## 2019-02-25 NOTE — DIAGNOSTIC IMAGING REPORT
ANDRESSA LOPEZ 

[facility]

[address] [facility] [address] [place] [address]. [address]

 

 

 

 

Report Submission Date: 2019 10:44:41 AM CST

Patient       Study

Name:   JORGE LOPEZ       Date:   2019 9:59:08 AM CST

MRN:   [id number]       Modality Type:   CT

Gender:   F       Description:   CT PELVIS W/O CONTRAST

:   47       Institution:   [facility]

Physician:   ANDRESSA LOPEZ

     Accession:    [id number]

 

 

Examination: CT pelvis. 



History: FALL LAST NIGHT, ABNORMAL XRAY RESULTS. TAILBONE PAIN. PREVIOUS LEFT 
HIP REPLACEMENT. 



Comparison exams: Plain film dated 2019 



Technique: Axial imaging was sagittal and coronal reconstruction. 



Findings: Artifact from left hip replacement. Complex fractures involving the 
superior/inferior pubic rami near the pubic symphysis. Fracture involving the 
medial margin of the superior pubic rami. Fracture mid aspect right inferior 
pubic rami. Left superior and inferior pubic rami appear to be intact. Margins 
of the right femoral head, neck, intertrochanteric region shaft are within 
normal limits. Margins of the acetabulum and iliac wings appear to be intact. 
Lumbar spine degenerative changes. Congenital angulation of the sacrococcygeal 
region. Visualized intra-pelvic structures without gross abnormality. No 
evidence for soft tissue fluid collection/hematoma. 



Impression: Fractures involving the right superior/inferior pubic rami as 
described.

 

Electronically signed on 2019 10:44:41 AM CST by:

Phillip QUINN

## 2019-06-25 ENCOUNTER — HOSPITAL ENCOUNTER (OUTPATIENT)
Dept: HOSPITAL 44 - LAB | Age: 74
End: 2019-06-25
Attending: CLINICAL NURSE SPECIALIST
Payer: COMMERCIAL

## 2019-06-25 DIAGNOSIS — Z87.310: ICD-10-CM

## 2019-06-25 DIAGNOSIS — M81.0: Primary | ICD-10-CM

## 2019-06-25 LAB — EGFR (NON-AFRICAN): > 60

## 2019-06-25 PROCEDURE — 36415 COLL VENOUS BLD VENIPUNCTURE: CPT

## 2019-06-25 PROCEDURE — 82306 VITAMIN D 25 HYDROXY: CPT

## 2019-06-25 PROCEDURE — 80053 COMPREHEN METABOLIC PANEL: CPT

## 2019-10-08 ENCOUNTER — HOSPITAL ENCOUNTER (OUTPATIENT)
Dept: HOSPITAL 44 - LAB | Age: 74
End: 2019-10-08
Attending: CLINICAL NURSE SPECIALIST
Payer: COMMERCIAL

## 2019-10-08 DIAGNOSIS — E55.9: ICD-10-CM

## 2019-10-08 DIAGNOSIS — F17.200: ICD-10-CM

## 2019-10-08 DIAGNOSIS — M81.0: Primary | ICD-10-CM

## 2019-10-08 DIAGNOSIS — Z87.310: ICD-10-CM

## 2019-10-08 PROCEDURE — 82310 ASSAY OF CALCIUM: CPT

## 2019-10-08 PROCEDURE — 36415 COLL VENOUS BLD VENIPUNCTURE: CPT

## 2019-10-08 PROCEDURE — 82306 VITAMIN D 25 HYDROXY: CPT

## 2019-11-18 ENCOUNTER — HOSPITAL ENCOUNTER (OUTPATIENT)
Dept: HOSPITAL 44 - LAB | Age: 74
End: 2019-11-18
Attending: CLINICAL NURSE SPECIALIST
Payer: COMMERCIAL

## 2019-11-18 DIAGNOSIS — Z87.310: ICD-10-CM

## 2019-11-18 DIAGNOSIS — F17.200: ICD-10-CM

## 2019-11-18 DIAGNOSIS — E55.9: Primary | ICD-10-CM

## 2019-11-18 DIAGNOSIS — M81.0: ICD-10-CM

## 2019-11-18 PROCEDURE — 36415 COLL VENOUS BLD VENIPUNCTURE: CPT

## 2019-11-18 PROCEDURE — 82310 ASSAY OF CALCIUM: CPT
